# Patient Record
Sex: FEMALE | URBAN - METROPOLITAN AREA
[De-identification: names, ages, dates, MRNs, and addresses within clinical notes are randomized per-mention and may not be internally consistent; named-entity substitution may affect disease eponyms.]

---
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
## 2021-06-06 NOTE — TELEPHONE ENCOUNTER
New Appointment Needed  What is the reason for the visit:    Pregnancy Confirmation Appt Needed  When was the first day of your last menstrual cycle?: 1/19/20  is calling   Have you done a home pregnancy test?: Yes, on Feb. 12  Provider Preference: James  How soon do you need to be seen?: Any  Waitlist offered?: No  Okay to leave a detailed message: Yes